# Patient Record
Sex: FEMALE | URBAN - METROPOLITAN AREA
[De-identification: names, ages, dates, MRNs, and addresses within clinical notes are randomized per-mention and may not be internally consistent; named-entity substitution may affect disease eponyms.]

---

## 2020-07-01 ENCOUNTER — INPATIENT (INPATIENT)
Facility: HOSPITAL | Age: 55
LOS: 0 days | Discharge: HOME | End: 2020-07-02
Attending: HOSPITALIST | Admitting: HOSPITALIST
Payer: COMMERCIAL

## 2020-07-01 VITALS
DIASTOLIC BLOOD PRESSURE: 71 MMHG | TEMPERATURE: 98 F | HEIGHT: 62 IN | HEART RATE: 77 BPM | OXYGEN SATURATION: 100 % | WEIGHT: 111.99 LBS | SYSTOLIC BLOOD PRESSURE: 106 MMHG | RESPIRATION RATE: 16 BRPM

## 2020-07-01 DIAGNOSIS — I63.9 CEREBRAL INFARCTION, UNSPECIFIED: ICD-10-CM

## 2020-07-01 LAB
ALBUMIN SERPL ELPH-MCNC: 3.9 G/DL — SIGNIFICANT CHANGE UP (ref 3.3–5)
ALP SERPL-CCNC: 58 U/L — SIGNIFICANT CHANGE UP (ref 40–120)
ALT FLD-CCNC: 17 U/L — SIGNIFICANT CHANGE UP (ref 12–78)
ANION GAP SERPL CALC-SCNC: 6 MMOL/L — SIGNIFICANT CHANGE UP (ref 5–17)
APPEARANCE UR: CLEAR — SIGNIFICANT CHANGE UP
AST SERPL-CCNC: 25 U/L — SIGNIFICANT CHANGE UP (ref 15–37)
BILIRUB SERPL-MCNC: 0.5 MG/DL — SIGNIFICANT CHANGE UP (ref 0.2–1.2)
BILIRUB UR-MCNC: NEGATIVE — SIGNIFICANT CHANGE UP
BUN SERPL-MCNC: 13 MG/DL — SIGNIFICANT CHANGE UP (ref 7–23)
CALCIUM SERPL-MCNC: 9.2 MG/DL — SIGNIFICANT CHANGE UP (ref 8.5–10.1)
CHLORIDE SERPL-SCNC: 105 MMOL/L — SIGNIFICANT CHANGE UP (ref 96–108)
CO2 SERPL-SCNC: 27 MMOL/L — SIGNIFICANT CHANGE UP (ref 22–31)
COLOR SPEC: YELLOW — SIGNIFICANT CHANGE UP
CREAT SERPL-MCNC: 0.88 MG/DL — SIGNIFICANT CHANGE UP (ref 0.5–1.3)
DIFF PNL FLD: NEGATIVE — SIGNIFICANT CHANGE UP
GLUCOSE SERPL-MCNC: 101 MG/DL — HIGH (ref 70–99)
GLUCOSE UR QL: NEGATIVE MG/DL — SIGNIFICANT CHANGE UP
HCT VFR BLD CALC: 41.2 % — SIGNIFICANT CHANGE UP (ref 34.5–45)
HGB BLD-MCNC: 13.8 G/DL — SIGNIFICANT CHANGE UP (ref 11.5–15.5)
KETONES UR-MCNC: ABNORMAL
LEUKOCYTE ESTERASE UR-ACNC: NEGATIVE — SIGNIFICANT CHANGE UP
MCHC RBC-ENTMCNC: 31.3 PG — SIGNIFICANT CHANGE UP (ref 27–34)
MCHC RBC-ENTMCNC: 33.5 GM/DL — SIGNIFICANT CHANGE UP (ref 32–36)
MCV RBC AUTO: 93.4 FL — SIGNIFICANT CHANGE UP (ref 80–100)
NITRITE UR-MCNC: NEGATIVE — SIGNIFICANT CHANGE UP
NRBC # BLD: 0 /100 WBCS — SIGNIFICANT CHANGE UP (ref 0–0)
NT-PROBNP SERPL-SCNC: 20 PG/ML — SIGNIFICANT CHANGE UP (ref 0–125)
PH UR: 5 — SIGNIFICANT CHANGE UP (ref 5–8)
PLATELET # BLD AUTO: 230 K/UL — SIGNIFICANT CHANGE UP (ref 150–400)
POTASSIUM SERPL-MCNC: 4.7 MMOL/L — SIGNIFICANT CHANGE UP (ref 3.5–5.3)
POTASSIUM SERPL-SCNC: 4.7 MMOL/L — SIGNIFICANT CHANGE UP (ref 3.5–5.3)
PROT SERPL-MCNC: 8.5 GM/DL — HIGH (ref 6–8.3)
PROT UR-MCNC: NEGATIVE MG/DL — SIGNIFICANT CHANGE UP
RBC # BLD: 4.41 M/UL — SIGNIFICANT CHANGE UP (ref 3.8–5.2)
RBC # FLD: 12.6 % — SIGNIFICANT CHANGE UP (ref 10.3–14.5)
SARS-COV-2 RNA SPEC QL NAA+PROBE: SIGNIFICANT CHANGE UP
SODIUM SERPL-SCNC: 138 MMOL/L — SIGNIFICANT CHANGE UP (ref 135–145)
SP GR SPEC: 1.01 — SIGNIFICANT CHANGE UP (ref 1.01–1.02)
TROPONIN I SERPL-MCNC: <.015 NG/ML — SIGNIFICANT CHANGE UP (ref 0.01–0.04)
UROBILINOGEN FLD QL: NEGATIVE MG/DL — SIGNIFICANT CHANGE UP
WBC # BLD: 5.63 K/UL — SIGNIFICANT CHANGE UP (ref 3.8–10.5)
WBC # FLD AUTO: 5.63 K/UL — SIGNIFICANT CHANGE UP (ref 3.8–10.5)

## 2020-07-01 PROCEDURE — 71045 X-RAY EXAM CHEST 1 VIEW: CPT | Mod: 26

## 2020-07-01 PROCEDURE — 93010 ELECTROCARDIOGRAM REPORT: CPT

## 2020-07-01 PROCEDURE — 99285 EMERGENCY DEPT VISIT HI MDM: CPT

## 2020-07-01 PROCEDURE — 99223 1ST HOSP IP/OBS HIGH 75: CPT

## 2020-07-01 PROCEDURE — 70450 CT HEAD/BRAIN W/O DYE: CPT | Mod: 26

## 2020-07-01 RX ORDER — ASPIRIN/CALCIUM CARB/MAGNESIUM 324 MG
325 TABLET ORAL ONCE
Refills: 0 | Status: COMPLETED | OUTPATIENT
Start: 2020-07-01 | End: 2020-07-01

## 2020-07-01 RX ORDER — ATORVASTATIN CALCIUM 80 MG/1
40 TABLET, FILM COATED ORAL AT BEDTIME
Refills: 0 | Status: DISCONTINUED | OUTPATIENT
Start: 2020-07-01 | End: 2020-07-02

## 2020-07-01 RX ORDER — ASPIRIN/CALCIUM CARB/MAGNESIUM 324 MG
81 TABLET ORAL DAILY
Refills: 0 | Status: DISCONTINUED | OUTPATIENT
Start: 2020-07-02 | End: 2020-07-02

## 2020-07-01 RX ORDER — ENOXAPARIN SODIUM 100 MG/ML
40 INJECTION SUBCUTANEOUS DAILY
Refills: 0 | Status: DISCONTINUED | OUTPATIENT
Start: 2020-07-01 | End: 2020-07-02

## 2020-07-01 RX ADMIN — Medication 325 MILLIGRAM(S): at 21:23

## 2020-07-01 RX ADMIN — Medication 1 TABLET(S): at 22:53

## 2020-07-01 RX ADMIN — ATORVASTATIN CALCIUM 40 MILLIGRAM(S): 80 TABLET, FILM COATED ORAL at 22:54

## 2020-07-01 RX ADMIN — ENOXAPARIN SODIUM 40 MILLIGRAM(S): 100 INJECTION SUBCUTANEOUS at 22:54

## 2020-07-01 NOTE — ED ADULT TRIAGE NOTE - CHIEF COMPLAINT QUOTE
frontal headaches with pressure behind left eye onset 1130 this am Pt went to urgent care and was sent to ER no weakness, but tingling left side pt denies CP or SOB Pt denies Covid or known covid exposure

## 2020-07-01 NOTE — H&P ADULT - NSHPREVIEWOFSYSTEMS_GEN_ALL_CORE
REVIEW OF SYSTEMS:  CONSTITUTIONAL: No fever, weight loss, or fatigue  EYES: No eye pain, visual disturbances, or discharge  ENMT:  No difficulty hearing, tinnitus, vertigo; No sinus or throat pain  NECK: No pain or stiffness  BREASTS: No pain, masses, or nipple discharge  RESPIRATORY: No cough, wheezing, chills or hemoptysis; No shortness of breath  CARDIOVASCULAR: No chest pain, palpitations, dizziness, or leg swelling  GASTROINTESTINAL: No abdominal or epigastric pain. + nausea, no vomiting, or hematemesis; No diarrhea or constipation. No melena or hematochezia.  GENITOURINARY: No dysuria, frequency, hematuria, or incontinence  NEUROLOGICAL: +headache,  loss of strength, numbness, no tremors  SKIN: No itching, burning, rashes, or lesions   LYMPH NODES: No enlarged glands  ENDOCRINE: No heat or cold intolerance; No hair loss  MUSCULOSKELETAL: No joint pain or swelling; No muscle, back, or extremity pain  PSYCHIATRIC: No depression, anxiety, mood swings, or difficulty sleeping  HEME/LYMPH: No easy bruising, or bleeding gums  ALLERGY AND IMMUNOLOGIC: No hives or eczema

## 2020-07-01 NOTE — CONSULT NOTE ADULT - ASSESSMENT
Pt with acute onset this morning of L side weakness and sensory disturbance.  On examintion now, some seven and a half hours later, has a mild L hemiparesis (UE and LE) with no evident sensory disturbance.  Likely ischemic stroke; suspect R internal capsule.  Head CT has been ordered.    RECOMMENDATIONS:    If no hemorrhage on CT: [id number] mg STAT, then 81  mg daily.  Statin  Clopidogrel 75mg daily x 3 wks  Expect little or no significant findings on head CT given relatively mild deficits and short time since onset.  MR brain (stroke protocol), MRA neck and brain  Work up for causes of possible vasculopathy:    ESR, CRP, Hgb A1c, homocysteine, SPEP, SHARRON, antiphospholipid syndrome panel, D-dimer, ferritin, serum immunofixation,  RF, syphilis serology, complement C3 and C4, methylmalonic acid, protein C. protein S, factor V Leiden.     Cardiac monitoring; TTE. Pt with acute onset this morning of L side weakness and sensory disturbance.  On examintion now, some seven and a half hours later, has a mild L hemiparesis (UE and LE) with no evident sensory disturbance.  Likely ischemic stroke; suspect R internal capsule.  Head CT has been ordered.    RECOMMENDATIONS:    Admit for acute stroke.  If no hemorrhage on CT: [id number] mg STAT, then 81  mg daily; clopidogrel 75mg daily x 3 wks.  Statin  Expect little or no significant findings on head CT given relatively mild deficits and short time since onset.  MR brain (stroke protocol), MRA neck and brain  Work up for causes of possible vasculopathy:    ESR, CRP, Hgb A1c, homocysteine, SPEP, SHARRON, antiphospholipid syndrome panel, D-dimer, ferritin, serum immunofixation,  RF, syphilis serology, complement C3 and C4, methylmalonic acid, protein C. protein S, factor V Leiden.     Cardiac monitoring; TTE.

## 2020-07-01 NOTE — ED ADULT NURSE NOTE - OBJECTIVE STATEMENT
Pt c/o intermittent frontal pressure headache with pressure behind left eye, denies blurry vision. Pt denies dizziness, c/o nausea denies vomiting. Pt c/o tingling and numbness to left side of body, denies chest pain. pt states symptoms started about 11 A.M.

## 2020-07-01 NOTE — H&P ADULT - HISTORY OF PRESENT ILLNESS
53 y/o F with no pmhx comes in since she felt left sided weakness since 11:30 this AM. She states she had bit of pressure below her left eye but had no changes in vision. Pt did have weakness numbness tingling on left arm and hand. Pt had no slurred speech, no facial droop. She saw PMD and was convinced to come to ED for concern of stroke. Pt has no stroke hx in past. Pt has no CP or palpitations, no abd pain, some nausea, no vomiting, no cough no fever, sick contacts. Has history migraine headache, symptoms were different, headache has now resolved. She has no history HTN, HL, DM, cardiac problems or previous neurological disease.	    HIV:    HIV Status:  · Offered: Declined	    PAST MEDICAL/SURGICAL/FAMILY/SOCIAL HISTORY:    Past Medical History:  No pertinent past medical history.     Tobacco Usage:  · Tobacco Usage	Never smoker	    ALLERGIES AND HOME MEDICATIONS:   Allergies:        Allergies:  	No Known Allergies:     Home Medications:   * Incomplete Medication History as of 01-Jul-2020 17:19 documented in Structured Notes  · 	Calcium 600+D oral tablet: Last Dose Taken:  , 500 milligram(s) orally 3 times a day    REVIEW OF SYSTEMS:    Review of Systems:  · ROS STATEMENT: all other ROS negative except as per HPI	    PHYSICAL EXAM:   · CONSTITUTIONAL: Well appearing, awake, alert, oriented to person, place, time/situation and in no apparent distress.	  · ENMT: Airway patent, Nasal mucosa clear. Mouth with normal mucosa. Throat has no vesicles, no oropharyngeal exudates and uvula is midline.	  · EYES: Clear bilaterally, pupils equal, round and reactive to light.	  · CARDIAC: Normal rate, regular rhythm.  Heart sounds S1, S2.  No murmurs, rubs or gallops.	  · RESPIRATORY: Breath sounds clear and equal bilaterally.	  · GASTROINTESTINAL: Abdomen soft, non-tender, no guarding.	  · MUSCULOSKELETAL: Spine appears normal, range of motion is not limited, no muscle or joint tenderness	  · NEUROLOGICAL: - - -	  · NEURO LOC: alert  follows commands	  · NEURO NERVE: cranial nerves 2-12 intact, corneal reflex intact, central and peripheral vision intact, extra-ocular movements intact, tongue is midline	  · NEURO NECK: normal	  · NEURO SPEECH: clear	  · NEURO WEIGHT: + strength is 4/5 in left arm, 4/5 strength in left leg. + slower toe tapping left foot	  · NEURO DTR: normal	  · NEURO SENSATION: present and normal in 4 extremities	  · NEURO COORDINATION: normal	  · NEURO PRONATOR: none	  · NEURO LEG NORM: normal bilaterally	  · NEURO MOTOR: normal	  · NEURO POSTURING: normal	  · SKIN: Skin normal color for race, warm, dry and intact. No evidence of rash.

## 2020-07-01 NOTE — H&P ADULT - NSHPLABSRESULTS_GEN_ALL_CORE
LABS:                        13.8   5.63  )-----------( 230      ( 2020 18:41 )             41.2     07-    138  |  105  |  13  ----------------------------<  101<H>  4.7   |  27  |  0.88    Ca    9.2      2020 18:41    TPro  8.5<H>  /  Alb  3.9  /  TBili  0.5  /  DBili  x   /  AST  25  /  ALT  17  /  AlkPhos  58  07      Urinalysis Basic - ( 2020 18:41 )    Color: Yellow / Appearance: Clear / S.010 / pH: x  Gluc: x / Ketone: Moderate  / Bili: Negative / Urobili: Negative mg/dL   Blood: x / Protein: Negative mg/dL / Nitrite: Negative   Leuk Esterase: Negative / RBC: x / WBC x   Sq Epi: x / Non Sq Epi: x / Bacteria: x      CAPILLARY BLOOD GLUCOSE  COVID-19 PCR: NotDetec: This test has been validated by [employer] to be accurate;  though it has not been FDA cleared/approved by the usual pathway  As with all laboratory test, results should be correlated with clinical  findings.  https://www.fda.gov/media/433902/download  https://www.fda.gov/media/669714/download (20 @ 18:41)  Troponin I, Serum: <.015: ng/mL (20 @ 18:41)  Serum Pro-Brain Natriuretic Peptide: 20 pg/mL (20 @ 18:41)                RADIOLOGY & ADDITIONAL TESTS:  < from: CT Head No Cont (20 @ 19:05) >    IMPRESSION:    1)  unremarkable CT study of the brain  2)  clear sinuses and mastoids..    < end of copied text >    < from: Xray Chest 1 View- PORTABLE-Urgent (20 @ 20:09) >    IMPRESSION: Negative chest.    < end of copied text >      Imaging Personally Reviewed:  [x ] YES  [ ] NO  EKG: sinus@ 79 no acute ST changes

## 2020-07-01 NOTE — H&P ADULT - NSHPPHYSICALEXAM_GEN_ALL_CORE
T(C): 36.6 (01 Jul 2020 16:25), Max: 36.6 (01 Jul 2020 16:25)  T(F): 97.9 (01 Jul 2020 16:25), Max: 97.9 (01 Jul 2020 16:25)  HR: 76 (01 Jul 2020 19:59) (76 - 77)  BP: 102/57 (01 Jul 2020 19:59) (102/57 - 106/71)  BP(mean): --  RR: 15 (01 Jul 2020 19:59) (15 - 16)  SpO2: 100% (01 Jul 2020 19:59) (100% - 100%)    PHYSICAL EXAM:  GENERAL: NAD, well-groomed, well-developed  HEAD:  Atraumatic, Normocephalic  EYES: EOMI, PERRLA, conjunctiva and sclera clear  ENMT: No tonsillar erythema, exudates, or enlargement; Moist mucous membranes, Good dentition, No lesions  NECK: Supple, No JVD, Normal thyroid  NERVOUS SYSTEM:  Alert & Oriented X3, CN 2-12 intact, no focal deficits (NIHSS=0)  CHEST/LUNG: Clear to percussion bilaterally; No rales, rhonchi, wheezing, or rubs  HEART: Regular rate and rhythm; No murmurs, rubs, or gallops  ABDOMEN: Soft, Nontender, Nondistended; Bowel sounds present  EXTREMITIES:  2+ Peripheral Pulses, No clubbing, cyanosis, or edema  LYMPH: No lymphadenopathy noted  SKIN: No rashes or lesions

## 2020-07-01 NOTE — H&P ADULT - PROBLEM SELECTOR PLAN 1
monitor on telemetry, ASA, anticoagulation, statin, MR brain, MRA brain and neck, lipid profile, HgA1c, neurology consult

## 2020-07-01 NOTE — ED PROVIDER NOTE - CRANIAL NERVE AND PUPILLARY EXAM
cranial nerves 2-12 intact/central and peripheral vision intact/tongue is midline/corneal reflex intact/extra-ocular movements intact

## 2020-07-01 NOTE — ED PROVIDER NOTE - CLINICAL SUMMARY MEDICAL DECISION MAKING FREE TEXT BOX
DDX: CVA, NIH stroke scale 2, outside of TPA window. r/o ICH   PLAN: CT head. CBC, CMP, troponin, chest x-ray, EKG, neuro consult likely admit.

## 2020-07-01 NOTE — CONSULT NOTE ADULT - SUBJECTIVE AND OBJECTIVE BOX
Pt interviewed and examined in ED with Dr. Easley, ED attending who had called for neurological consultation.      Per ED provider Note:    <Start of quote from ED Provider Note>  "· Chief Complaint: The patient is a 54y Female complaining of headache.	  · HPI Objective Statement: 55 y/o F with no pmhx comes in since she felt left sided weakness since 11:30 this AM. She states she had bit of pressure below her left eye but had no changes in vision. Pt did have weakness numbness tingling on left arm and hand. Pt had no slurred speech, no facial droop. She saw PMD and was convinced to come to ED for concern of stroke. Pt has no stroke hx in past. Pt has no CP no abd pain, no cough no fever.	    HIV:    HIV Status:  · Offered: Declined	    PAST MEDICAL/SURGICAL/FAMILY/SOCIAL HISTORY:    Past Medical History:  No pertinent past medical history.     Tobacco Usage:  · Tobacco Usage	Never smoker	    ALLERGIES AND HOME MEDICATIONS:   Allergies:        Allergies:  	No Known Allergies:     Home Medications:   * Incomplete Medication History as of 2020 17:19 documented in Structured Notes  · 	Calcium 600+D oral tablet: Last Dose Taken:  , 500 milligram(s) orally 3 times a day    REVIEW OF SYSTEMS:    Review of Systems:  · ROS STATEMENT: all other ROS negative except as per HPI"	  <End of quote from ED Provider Note>    Above Hx was reconfirmed on my interviewing Pt. Additional Hx: Fx of L wrist some years ago.  She denies having had any previous episodes of neurologic deficits.  Has no pain at this time.  Father  of cancer.  No relevant family history (stroke, autoimmune disease, vascular diseas in first degree relatives).      EXAMINATION     Awake, alert, fully aware of environment and situation.  Medium thin to thin build.  Normal comprhension, expression, prosody, speech articulation.  Normal facila/lingual movements.  PERRL; EOMI with smooth pursuit.  Confrontation visual fields grossly intact.      No UE drift.  Mildly clumsy Quinton of L fingers, L eft toe tapping, and (less so) left heel stomping.  On confrontation testing of limb motor power there is clearly demonstrable weakness of LUE and LLE.  For examples: thumb/forfinger pinch, and even more so thumb apposition, are weak compared with the R side.  The L triceps can be "broken" with only modest effort (with elbow locked in extension) while the R triceps cannot be "broken" even with substantial force.  L biceps <50% compared with R.  L quadriceps can be "broken" when she locks the knee in extension (this should not be possible in someone with normal strength).  L hamstrings <= 50% of R.      No subjective appreciation of difference in P and LT sensation  L vs. R side (face, neck, UEs, LEs).  No extinction of LT on DSS.      Reflex                           Right    Left   Comment    Scapulohumeral               0        0  Biceps                             1+     1+  Triceps                            tr       tr  Patellar                            2       2+  Gastroc                            0       0  Plantar                           mute   mute

## 2020-07-01 NOTE — ED PROVIDER NOTE - OBJECTIVE STATEMENT
55 y/o F with no pmhx comes in since she felt left sided weakness since 11:30 this AM. She states she had bit of pressure below her left eye but had no changes in vision. Pt did have weakness numbness tingling on left arm and hand. Pt had no slurred speech, no facial droop. She saw PMD and was convinced to come to ED for concern of stroke. Pt has no stroke hx in past. Pt has no CP no abd pain, no cough no fever.

## 2020-07-01 NOTE — H&P ADULT - ASSESSMENT
53 y/o F with no pmhx comes in since she felt left sided weakness since 11:30 this AM. She states she had bit of pressure below her left eye but had no changes in vision. Pt did have weakness numbness tingling on left arm and hand. Pt had no slurred speech, no facial droop. She saw PMD and was convinced to come to ED for concern of stroke. Pt has no stroke hx in past. Pt has no CP or palpitations, no abd pain, some nausea, no vomiting, no cough no fever, sick contacts. Has history migraine headache, symptoms were different, headache has now resolved. She has no history HTN, HL, DM, cardiac problems or previous neurological disease.        IMPROVE VTE Individual Risk Assessment          RISK                                                          Points    [  ] Previous VTE                                                3    [  ] Thrombophilia                                             2    [  ] Lower limb paralysis                                    2        (unable to hold up >15 seconds)      [  ] Current Cancer                                             2         (within 6 months)    [  ] Immobilization > 24 hrs                              1    [  ] ICU/CCU stay > 24 hours                            1    [  ] Age > 60                                                    1    IMPROVE VTE Score __0_______ 55 y/o F with no pmhx comes in since she felt left sided weakness since 11:30 this AM. She states she had bit of pressure below her left eye but had no changes in vision. Pt did have weakness numbness tingling on left arm and hand. Pt had no slurred speech, no facial droop. She saw PMD and was convinced to come to ED for concern of stroke. Pt has no stroke hx in past. Pt has no CP or palpitations, no abd pain, some nausea, no vomiting, no cough no fever, sick contacts. Has history migraine headache, symptoms were different, headache has now resolved. She has no history HTN, HL, DM, cardiac problems or previous neurological disease. Pt seen by Dr Nino neurologist in ED, pt had L sided  hemiparesis at that time, not felt to be TPA candidate. Pt passed dysphagia screen.        IMPROVE VTE Individual Risk Assessment          RISK                                                          Points    [  ] Previous VTE                                                3    [  ] Thrombophilia                                             2    [  ] Lower limb paralysis                                    2        (unable to hold up >15 seconds)      [  ] Current Cancer                                             2         (within 6 months)    [  ] Immobilization > 24 hrs                              1    [  ] ICU/CCU stay > 24 hours                            1    [  ] Age > 60                                                    1    IMPROVE VTE Score __0_______

## 2020-07-02 ENCOUNTER — TRANSCRIPTION ENCOUNTER (OUTPATIENT)
Age: 55
End: 2020-07-02

## 2020-07-02 VITALS
RESPIRATION RATE: 18 BRPM | HEART RATE: 89 BPM | OXYGEN SATURATION: 98 % | DIASTOLIC BLOOD PRESSURE: 69 MMHG | SYSTOLIC BLOOD PRESSURE: 94 MMHG

## 2020-07-02 LAB
A1C WITH ESTIMATED AVERAGE GLUCOSE RESULT: 5.3 % — SIGNIFICANT CHANGE UP (ref 4–5.6)
ANA TITR SER: NEGATIVE — SIGNIFICANT CHANGE UP
ANION GAP SERPL CALC-SCNC: 6 MMOL/L — SIGNIFICANT CHANGE UP (ref 5–17)
BASOPHILS # BLD AUTO: 0.03 K/UL — SIGNIFICANT CHANGE UP (ref 0–0.2)
BASOPHILS NFR BLD AUTO: 0.7 % — SIGNIFICANT CHANGE UP (ref 0–2)
BUN SERPL-MCNC: 8 MG/DL — SIGNIFICANT CHANGE UP (ref 7–23)
CALCIUM SERPL-MCNC: 9.4 MG/DL — SIGNIFICANT CHANGE UP (ref 8.5–10.1)
CHLORIDE SERPL-SCNC: 106 MMOL/L — SIGNIFICANT CHANGE UP (ref 96–108)
CHOLEST SERPL-MCNC: 163 MG/DL — SIGNIFICANT CHANGE UP (ref 10–199)
CO2 SERPL-SCNC: 29 MMOL/L — SIGNIFICANT CHANGE UP (ref 22–31)
CREAT SERPL-MCNC: 0.78 MG/DL — SIGNIFICANT CHANGE UP (ref 0.5–1.3)
EOSINOPHIL # BLD AUTO: 0.08 K/UL — SIGNIFICANT CHANGE UP (ref 0–0.5)
EOSINOPHIL NFR BLD AUTO: 2 % — SIGNIFICANT CHANGE UP (ref 0–6)
ESTIMATED AVERAGE GLUCOSE: 105 MG/DL — SIGNIFICANT CHANGE UP (ref 68–114)
GLUCOSE SERPL-MCNC: 76 MG/DL — SIGNIFICANT CHANGE UP (ref 70–99)
HCT VFR BLD CALC: 40.4 % — SIGNIFICANT CHANGE UP (ref 34.5–45)
HCV AB S/CO SERPL IA: 0.12 S/CO — SIGNIFICANT CHANGE UP (ref 0–0.99)
HCV AB SERPL-IMP: SIGNIFICANT CHANGE UP
HDLC SERPL-MCNC: 65 MG/DL — SIGNIFICANT CHANGE UP
HGB BLD-MCNC: 13.1 G/DL — SIGNIFICANT CHANGE UP (ref 11.5–15.5)
IMM GRANULOCYTES NFR BLD AUTO: 0 % — SIGNIFICANT CHANGE UP (ref 0–1.5)
LIPID PNL WITH DIRECT LDL SERPL: 85 MG/DL — SIGNIFICANT CHANGE UP
LYMPHOCYTES # BLD AUTO: 1.19 K/UL — SIGNIFICANT CHANGE UP (ref 1–3.3)
LYMPHOCYTES # BLD AUTO: 29.4 % — SIGNIFICANT CHANGE UP (ref 13–44)
MCHC RBC-ENTMCNC: 31.3 PG — SIGNIFICANT CHANGE UP (ref 27–34)
MCHC RBC-ENTMCNC: 32.4 GM/DL — SIGNIFICANT CHANGE UP (ref 32–36)
MCV RBC AUTO: 96.4 FL — SIGNIFICANT CHANGE UP (ref 80–100)
MONOCYTES # BLD AUTO: 0.46 K/UL — SIGNIFICANT CHANGE UP (ref 0–0.9)
MONOCYTES NFR BLD AUTO: 11.4 % — SIGNIFICANT CHANGE UP (ref 2–14)
NEUTROPHILS # BLD AUTO: 2.29 K/UL — SIGNIFICANT CHANGE UP (ref 1.8–7.4)
NEUTROPHILS NFR BLD AUTO: 56.5 % — SIGNIFICANT CHANGE UP (ref 43–77)
NRBC # BLD: 0 /100 WBCS — SIGNIFICANT CHANGE UP (ref 0–0)
PLATELET # BLD AUTO: 215 K/UL — SIGNIFICANT CHANGE UP (ref 150–400)
POTASSIUM SERPL-MCNC: 4.2 MMOL/L — SIGNIFICANT CHANGE UP (ref 3.5–5.3)
POTASSIUM SERPL-SCNC: 4.2 MMOL/L — SIGNIFICANT CHANGE UP (ref 3.5–5.3)
RBC # BLD: 4.19 M/UL — SIGNIFICANT CHANGE UP (ref 3.8–5.2)
RBC # FLD: 12.8 % — SIGNIFICANT CHANGE UP (ref 10.3–14.5)
SARS-COV-2 IGG SERPL QL IA: NEGATIVE — SIGNIFICANT CHANGE UP
SARS-COV-2 IGM SERPL IA-ACNC: 0.01 INDEX — SIGNIFICANT CHANGE UP
SODIUM SERPL-SCNC: 141 MMOL/L — SIGNIFICANT CHANGE UP (ref 135–145)
TOTAL CHOLESTEROL/HDL RATIO MEASUREMENT: 2.5 RATIO — LOW (ref 3.3–7.1)
TRIGL SERPL-MCNC: 63 MG/DL — SIGNIFICANT CHANGE UP (ref 10–149)
WBC # BLD: 4.05 K/UL — SIGNIFICANT CHANGE UP (ref 3.8–10.5)
WBC # FLD AUTO: 4.05 K/UL — SIGNIFICANT CHANGE UP (ref 3.8–10.5)

## 2020-07-02 PROCEDURE — 70547 MR ANGIOGRAPHY NECK W/O DYE: CPT | Mod: 26

## 2020-07-02 PROCEDURE — 99239 HOSP IP/OBS DSCHRG MGMT >30: CPT

## 2020-07-02 PROCEDURE — 99233 SBSQ HOSP IP/OBS HIGH 50: CPT

## 2020-07-02 PROCEDURE — 70551 MRI BRAIN STEM W/O DYE: CPT | Mod: 26

## 2020-07-02 PROCEDURE — 70544 MR ANGIOGRAPHY HEAD W/O DYE: CPT | Mod: 26,59

## 2020-07-02 PROCEDURE — 93306 TTE W/DOPPLER COMPLETE: CPT | Mod: 26

## 2020-07-02 RX ORDER — CLOPIDOGREL BISULFATE 75 MG/1
1 TABLET, FILM COATED ORAL
Qty: 20 | Refills: 0
Start: 2020-07-02 | End: 2020-07-21

## 2020-07-02 RX ORDER — ASPIRIN/CALCIUM CARB/MAGNESIUM 324 MG
1 TABLET ORAL
Qty: 0 | Refills: 0 | DISCHARGE
Start: 2020-07-02

## 2020-07-02 RX ORDER — CLOPIDOGREL BISULFATE 75 MG/1
1 TABLET, FILM COATED ORAL
Qty: 0 | Refills: 0 | DISCHARGE
Start: 2020-07-02

## 2020-07-02 RX ORDER — CLOPIDOGREL BISULFATE 75 MG/1
75 TABLET, FILM COATED ORAL DAILY
Refills: 0 | Status: DISCONTINUED | OUTPATIENT
Start: 2020-07-02 | End: 2020-07-02

## 2020-07-02 RX ORDER — ASPIRIN/CALCIUM CARB/MAGNESIUM 324 MG
1 TABLET ORAL
Qty: 30 | Refills: 0
Start: 2020-07-02 | End: 2020-07-31

## 2020-07-02 RX ORDER — ATORVASTATIN CALCIUM 80 MG/1
1 TABLET, FILM COATED ORAL
Qty: 0 | Refills: 0 | DISCHARGE
Start: 2020-07-02

## 2020-07-02 RX ORDER — ATORVASTATIN CALCIUM 80 MG/1
1 TABLET, FILM COATED ORAL
Qty: 30 | Refills: 0
Start: 2020-07-02 | End: 2020-07-31

## 2020-07-02 RX ADMIN — Medication 1 TABLET(S): at 05:20

## 2020-07-02 RX ADMIN — Medication 1 TABLET(S): at 13:34

## 2020-07-02 RX ADMIN — ENOXAPARIN SODIUM 40 MILLIGRAM(S): 100 INJECTION SUBCUTANEOUS at 11:34

## 2020-07-02 RX ADMIN — CLOPIDOGREL BISULFATE 75 MILLIGRAM(S): 75 TABLET, FILM COATED ORAL at 11:35

## 2020-07-02 RX ADMIN — Medication 81 MILLIGRAM(S): at 11:35

## 2020-07-02 NOTE — PHYSICAL THERAPY INITIAL EVALUATION ADULT - PERTINENT HX OF CURRENT PROBLEM, REHAB EVAL
Pt is a 55 y/o female who presented to ER due to acute onset of neurological deficits. Symptoms have since resolved . Pt is diagnosed with CVA. MRI on 7/2/2020 results confirm No acute infarct.

## 2020-07-02 NOTE — OCCUPATIONAL THERAPY INITIAL EVALUATION ADULT - ADDITIONAL COMMENTS
Prior to admission, pt was functioning in her roles, self sufficient, driving  & ambulating independently without any assistive devices. Pt does not shown any significant changes when compared to pre admission status. Pt moves all extremities without difficulties and integrates both sides of her body. Pt has good balance & is able to reach out of base of support in sitting and standing without loss of balance. Pt has good muscle strength, good coordination and normal muscle tone. Pt ambulated at brenda on unit without any adaptive device. Pt does not display any deficits that warrant OT intervention at this time. Pt is discharges from OT service, however, pt will be re-evaluated if she demonstrates functional decline. Pt is right hand dominant and wears glasses for reading. Prior to admission, pt was functioning in her roles, self sufficient, driving  & ambulating independently without any assistive devices. Pt does not shown any significant changes when compared to pre admission status. Pt moves all extremities without difficulties and integrates both sides of her body. Pt has good balance & is able to reach out of base of support in sitting and standing without loss of balance. Pt has good muscle strength, good coordination and normal muscle tone. Pt ambulated at brenda on unit without any adaptive device. Pt does not display any deficits that warrant OT intervention at this time. Pt is discharges from OT service, however, pt will be re-evaluated if pt demonstrates functional decline. Pt is right hand dominant and wears glasses for reading.

## 2020-07-02 NOTE — OCCUPATIONAL THERAPY INITIAL EVALUATION ADULT - GENERAL OBSERVATIONS, REHAB EVAL
Pt was seen for initial OT consult, encountered on cardiac monitoring and in NAD. IV heplock is in place. Pt was AA&Ox4, cooperative & followed commands.  Pt denied pain, numbness or tingling, Pt's speech is clear and she make needs & wants known. Pt moves all extremities without difficulties and integrates both sides of her body. Pt was seen for initial OT consult, encountered on cardiac monitoring and in NAD. IV heplock is in place. Pt was AA&Ox4, cooperative & followed commands.  Pt denied pain, numbness or tingling, Pt's speech is clear; she makes needs & wants known. Pt moves all extremities without difficulties and integrates both sides of her body.

## 2020-07-02 NOTE — PROGRESS NOTE ADULT - SUBJECTIVE AND OBJECTIVE BOX
Pt reports no longer aware of any motor dysfunction.  On detailed examination of motor function:    intact symmetric normal (rapid) Quinton of fingers, forearms, LEs  (toe tapping and heel stomping).  Strong symmetric thumb/forefinger pinch, thumb apposition, elbow flex/ext, hip flex, knee flex/ext.  Walks fully up symmetrically on toes of both feet; walks well and symmetrically on heels; hops symmtrically and well on toes of each foot.      Brain MRI; neck and brain MRAs, all normal.      I note Hgb 1c 5.3%; SHARRON - specimen sent to lab; result pending.
Patient is a 54y old  Female who presents with a chief complaint of Headache and left sided weakness and numbness. (2020 21:44)      INTERVAL HPI/OVERNIGHT EVENTS:    MEDICATIONS  (STANDING):  aspirin enteric coated 81 milliGRAM(s) Oral daily  atorvastatin 40 milliGRAM(s) Oral at bedtime  calcium carbonate 1250 mG  + Vitamin D (OsCal 500 + D) 1 Tablet(s) Oral three times a day  clopidogrel Tablet 75 milliGRAM(s) Oral daily  enoxaparin Injectable 40 milliGRAM(s) SubCutaneous daily    MEDICATIONS  (PRN):      Allergies    No Known Allergies    Intolerances        REVIEW OF SYSTEMS:  CONSTITUTIONAL: No fever, weight loss, or fatigue  EYES: No eye pain, visual disturbances, or discharge  ENMT:  No difficulty hearing, tinnitus, vertigo; No sinus or throat pain  NECK: No pain or stiffness  BREASTS: No pain, masses, or nipple discharge  RESPIRATORY: No cough, wheezing, chills or hemoptysis; No shortness of breath  CARDIOVASCULAR: No chest pain, palpitations, dizziness, or leg swelling  GASTROINTESTINAL: No abdominal or epigastric pain. No nausea, vomiting, or hematemesis; No diarrhea or constipation. No melena or hematochezia.  GENITOURINARY: No dysuria, frequency, hematuria, or incontinence  NEUROLOGICAL: No headaches, memory loss, loss of strength, numbness, or tremors  SKIN: No itching, burning, rashes, or lesions   LYMPH NODES: No enlarged glands  ENDOCRINE: No heat or cold intolerance; No hair loss  MUSCULOSKELETAL: No joint pain or swelling; No muscle, back, or extremity pain  PSYCHIATRIC: No depression, anxiety, mood swings, or difficulty sleeping  HEME/LYMPH: No easy bruising, or bleeding gums  ALLERGY AND IMMUNOLOGIC: No hives or eczema    Vital Signs Last 24 Hrs  T(C): 36.7 (2020 11:34), Max: 36.9 (2020 00:08)  T(F): 98 (2020 11:34), Max: 98.4 (2020 00:08)  HR: 65 (2020 11:34) (65 - 78)  BP: 91/54 (2020 11:34) (91/54 - 106/71)  BP(mean): --  RR: 18 (2020 11:34) (15 - 18)  SpO2: 98% (2020 11:34) (98% - 100%)    PHYSICAL EXAM:  GENERAL: NAD, well-groomed, well-developed  HEAD:  Atraumatic, Normocephalic  EYES: EOMI, PERRLA, conjunctiva and sclera clear  ENMT: No tonsillar erythema, exudates, or enlargement; Moist mucous membranes, Good dentition, No lesions  NECK: Supple, No JVD, Normal thyroid  NERVOUS SYSTEM:  Alert & Oriented X3, Good concentration; Motor Strength 5/5 B/L upper and lower extremities; DTRs 2+ intact and symmetric  CHEST/LUNG: Clear to percussion bilaterally; No rales, rhonchi, wheezing, or rubs  HEART: Regular rate and rhythm; No murmurs, rubs, or gallops  ABDOMEN: Soft, Nontender, Nondistended; Bowel sounds present  EXTREMITIES:  2+ Peripheral Pulses, No clubbing, cyanosis, or edema  LYMPH: No lymphadenopathy noted  SKIN: No rashes or lesions    LABS:                        13.1   4.05  )-----------( 215      ( 2020 07:51 )             40.4     07-02    141  |  106  |  8   ----------------------------<  76  4.2   |  29  |  0.78    Ca    9.4      2020 07:51    TPro  8.5<H>  /  Alb  3.9  /  TBili  0.5  /  DBili  x   /  AST  25  /  ALT  17  /  AlkPhos  58  07-01      Urinalysis Basic - ( 2020 18:41 )    Color: Yellow / Appearance: Clear / S.010 / pH: x  Gluc: x / Ketone: Moderate  / Bili: Negative / Urobili: Negative mg/dL   Blood: x / Protein: Negative mg/dL / Nitrite: Negative   Leuk Esterase: Negative / RBC: x / WBC x   Sq Epi: x / Non Sq Epi: x / Bacteria: x      CAPILLARY BLOOD GLUCOSE          RADIOLOGY & ADDITIONAL TESTS:    Imaging Personally Reviewed:  [ ] YES  [ ] NO    Consultant(s) Notes Reviewed:  [ ] YES  [ ] NO    Care Discussed with Consultants/Other Providers [ ] YES  [ ] NO

## 2020-07-02 NOTE — OCCUPATIONAL THERAPY INITIAL EVALUATION ADULT - LIVES WITH, PROFILE
her 86 y/o mother, who is independent in a private house with 8 steps equipped with hand rail All living amenities are located on one level. The bathroom has a tub/shower combination, fixed shower head and standard toilet. ,

## 2020-07-02 NOTE — DISCHARGE NOTE PROVIDER - CARE PROVIDER_API CALL
Rosalind Santos  NEUROLOGY  [address] [place] [address]  Phone: [phone]  Fax: [phone]  Follow Up Time:     PMD,   Please follow up with your primary doctor in 1-2 weeks.  Phone: (   )    -  Fax: (   )    -  Follow Up Time: PMD,   Please follow up with your primary doctor in 1-2 weeks.  Phone: (   )    -  Fax: (   )    -  Follow Up Time:     [facility],   [address], [place]  Phone: [phone]  Fax: (   )    -  Follow Up Time:

## 2020-07-02 NOTE — DISCHARGE NOTE PROVIDER - HOSPITAL COURSE
55 yo F with no PMH presented to the ED on 7/1/20 with acute Lt hemiparesis x 7 hours, without sensory disturbance. Initial CT head negative, pt was admitted for acute CVA. MR brain and MRA head & neck unremarkable. Lipid profile and HbA1C within normal limits. Pt now with full resolution of symptoms, cleared by neurology. Started on ASA, plavix and statin. Currently stable for discharge. 55 yo F with no PMH presented to the ED on 7/1/20 with acute Lt hemiparesis x 7 hours, without sensory disturbance. Initial CT head negative, pt was admitted for acute CVA. MR brain and MRA head & neck unremarkable. Lipid profile and HbA1C within normal limits. Pt now with full resolution of symptoms; cleared by neurology, PT and OT. Started on ASA, plavix and statin. Currently stable for discharge.

## 2020-07-02 NOTE — DISCHARGE NOTE PROVIDER - PROVIDER TOKENS
PROVIDER:[TOKEN:[[id number]]],FREE:[LAST:[PMD],PHONE:[(   )    -],FAX:[(   )    -],ADDRESS:[Please follow up with your primary doctor in 1-2 weeks.]] FREE:[LAST:[PMD],PHONE:[(   )    -],FAX:[(   )    -],ADDRESS:[Please follow up with your primary doctor in 1-2 weeks.]],FREE:[LAST:[[facility]],PHONE:[phone]],FAX:[(   )    -],ADDRESS:[[address] [place] [address]]]

## 2020-07-02 NOTE — OCCUPATIONAL THERAPY INITIAL EVALUATION ADULT - SOCIAL CONCERNS
Pt voiced concerns about current DX of CVA and hope it does not recur./Complex psychosocial needs/coping issues

## 2020-07-02 NOTE — DISCHARGE NOTE PROVIDER - NSDCCPCAREPLAN_GEN_ALL_CORE_FT
PRINCIPAL DISCHARGE DIAGNOSIS  Diagnosis: Cerebrovascular accident (CVA), unspecified mechanism  Assessment and Plan of Treatment: Please follow up with a neurologist within 1-2 weeks. Continue to take aspirin, plavix and atorvastatin daily.

## 2020-07-02 NOTE — PHYSICAL THERAPY INITIAL EVALUATION ADULT - GENERAL OBSERVATIONS, REHAB EVAL
Pt was seen for initial PT consult, encountered standing on her room's door,  cardiac monitoring in place, NAD. AxOx4, did not complaint of pain, cooperative

## 2020-07-02 NOTE — PHYSICAL THERAPY INITIAL EVALUATION ADULT - ADDITIONAL COMMENTS
as per patient she lives in a  with her 88 y/o mother, she has 6 stairs to get inside the house with R HR, inside the house she states In the first floor where she can maneuver independently with no AD. she still drives and does work as a .

## 2020-07-02 NOTE — OCCUPATIONAL THERAPY INITIAL EVALUATION ADULT - PERTINENT HX OF CURRENT PROBLEM, REHAB EVAL
Pt is a 55 y/o female who presented to ER due to acute onset of neurological deficit Symptoms has since resolved . Pt is diagnosed with CVA. MRI on 7/2/2020 results confirm No acute infarct. Pt is a 53 y/o female who presented to ER due to acute onset of neurological deficits. Symptoms have since resolved . Pt is diagnosed with CVA. MRI on 7/2/2020 results confirm No acute infarct.

## 2020-07-02 NOTE — PROGRESS NOTE ADULT - PROBLEM SELECTOR PLAN 1
no events on telemetry  c/w , ASA, anticoagulation, statin for now    MR brain, MRA brain and neck, lipid profile, HgA1c, all wnl   f.u echo results and pt but possible dc today

## 2020-07-02 NOTE — PHYSICAL THERAPY INITIAL EVALUATION ADULT - LIVES WITH, PROFILE
her 88 y/o mother, who is independent in a private house with 8 steps equipped with hand rail All living amenities are located on one level. The bathroom has a tub/shower combination, fixed shower head and standard toilet. ,

## 2020-07-02 NOTE — DISCHARGE NOTE NURSING/CASE MANAGEMENT/SOCIAL WORK - PATIENT PORTAL LINK FT
You can access the FollowMyHealth Patient Portal offered by [facility] by registering at the following website: [email or web address]. By joining [facility]’s FollowMyHealth portal, you will also be able to view your health information using other applications (apps) compatible with our system.

## 2020-07-02 NOTE — PROGRESS NOTE ADULT - ASSESSMENT
Minor stroke with rapid clinical recovery.      MR/DWI negative ischemic stroke.      ADDITIONAL Hx:    Never used tobacco products; used OC for 3 years ~20 years ago.      RECOMMENDATIONS    Unchanged: ASA 81mg daily; statin; clopidogrel 75mg daily for a total of 21 days.      Reminder: please have samples drawn for additional lab tests to work up for unusual stroke etiology:          ESR, CRP, homocysteine, SPEP, antiphospholipid syndrome panel, D-dimer, ferritin, serum immunofixation,  RF, syphilis serology, complement C3 and C4, methylmalonic acid, protein C. protein S, factor V Leiden.     Follow-up out-pt with stroke neurology  [facility]  [address]    
53 y/o F with no pmhx comes in since she felt left sided weakness since 11:30 this AM. She states she had bit of pressure below her left eye but had no changes in vision. Pt did have weakness numbness tingling on left arm and hand. Pt had no slurred speech, no facial droop. She saw PMD and was convinced to come to ED for concern of stroke. Pt has no stroke hx in past. Pt has no CP or palpitations, no abd pain, some nausea, no vomiting, no cough no fever, sick contacts. Has history migraine headache, symptoms were different, headache has now resolved. She has no history HTN, HL, DM, cardiac problems or previous neurological disease. Pt seen by Dr Nino neurologist in ED, pt had mild L sided  hemiparesis at that time, not felt to be TPA candidate. Pt passed dysphagia screen.      Sxs now currently resolved and is asymptomatic. MRI neg for cva or vascular stenosis. Will discuss with neurology. echo results pending. pt apparently has questionable left sided weakness yesterday and pt reports that has been on and off for some time.

## 2020-07-02 NOTE — DISCHARGE NOTE PROVIDER - NSDCMRMEDTOKEN_GEN_ALL_CORE_FT
aspirin 81 mg oral delayed release tablet: 1 tab(s) orally once a day  aspirin 81 mg oral delayed release tablet: 1 tab(s) orally once a day  atorvastatin 40 mg oral tablet: 1 tab(s) orally once a day (at bedtime)  Calcium 600+D oral tablet: 500 milligram(s) orally 3 times a day  clopidogrel 75 mg oral tablet: 1 tab(s) orally once a day  clopidogrel 75 mg oral tablet: 1 tab(s) orally once a day  Lipitor 40 mg oral tablet: 1 tab(s) orally once a day (at bedtime)

## 2020-07-04 ENCOUNTER — TRANSCRIPTION ENCOUNTER (OUTPATIENT)
Age: 55
End: 2020-07-04

## 2020-07-06 PROBLEM — Z78.9 OTHER SPECIFIED HEALTH STATUS: Chronic | Status: ACTIVE | Noted: 2020-07-01

## 2020-07-07 DIAGNOSIS — I63.9 CEREBRAL INFARCTION, UNSPECIFIED: ICD-10-CM

## 2020-07-08 ENCOUNTER — APPOINTMENT (OUTPATIENT)
Dept: NEUROLOGY | Facility: CLINIC | Age: 55
End: 2020-07-08
Payer: COMMERCIAL

## 2020-07-08 DIAGNOSIS — I63.9 CEREBRAL INFARCTION, UNSPECIFIED: ICD-10-CM

## 2020-07-08 PROCEDURE — 99204 OFFICE O/P NEW MOD 45 MIN: CPT | Mod: 95

## 2020-07-08 RX ORDER — ASPIRIN 81 MG/1
81 TABLET, COATED ORAL
Refills: 0 | Status: ACTIVE | COMMUNITY

## 2020-10-02 ENCOUNTER — APPOINTMENT (OUTPATIENT)
Dept: NEUROLOGY | Facility: CLINIC | Age: 55
End: 2020-10-02
Payer: COMMERCIAL

## 2020-10-02 VITALS
HEART RATE: 89 BPM | DIASTOLIC BLOOD PRESSURE: 71 MMHG | HEIGHT: 62 IN | SYSTOLIC BLOOD PRESSURE: 126 MMHG | WEIGHT: 105 LBS | BODY MASS INDEX: 19.32 KG/M2

## 2020-10-02 VITALS — TEMPERATURE: 97.6 F

## 2020-10-02 DIAGNOSIS — R20.0 ANESTHESIA OF SKIN: ICD-10-CM

## 2020-10-02 PROCEDURE — 99215 OFFICE O/P EST HI 40 MIN: CPT

## 2020-10-02 NOTE — DISCUSSION/SUMMARY
[FreeTextEntry1] : Impression: left sided weakness, transient, less likely ischemic given lack of vascular risk factors and negative imaging. DDx includes possible migraine as reported by patient, however patient's previous clinical history and timeline doesn't necessarily support this differential. \par \par Plan:\par -no further stroke workup at this time\par -c/w ASA qd\par -c/w secondary stroke prevention w/ risk factor control \par -follow up as needed. \par \par \par  PCP: Dr. Justin Pan (P) [phone]  (F) [phone] \par \par  [Antithrombotic therapy with ___] : antithrombotic therapy with  [unfilled] [Patient encouraged to discuss with Primary MD] : I encouraged the patient to discuss these important issues with ~his/her~ primary care doctor [Goals and Counseling] : I have reviewed the goals of stroke risk factor modification. I counseled the patient on measures to reduce stroke risk, including the importance of medication compliance, risk factor control, exercise, healthy diet and avoidance of smoking. I reviewed stroke warning signs and symptoms and appropriate actions to take if such occur.

## 2020-10-02 NOTE — HISTORY OF PRESENT ILLNESS
[FreeTextEntry1] : F/u 10/2/20: 54 yo woman presents to clinic for follow up regarding previous transient left sided weakness. Previously diagnosed with MR negative stroke while at [place]. Since then, workup including hypercoagulable workup has been unremarkable. No new symptoms since. Patient has done well with controlling any vascular risk factors. \par __________\par Ms. Rojas is a 54 year old female with no signifcant PMHx who presents today for post hospitalization follow up (telemedicine) after a stroke on 7/1/2020\par \par HPI (from admission) \par 55 y/o F with no pmhx comes in since she felt left sided weakness since 11:30 this AM. She states she had bit of pressure below her left eye but had no changes in vision. Pt did have weakness numbness tingling on left arm and hand for about 7 hours. Pt had no slurred speech, no facial droop. She saw PMD and was convinced to come to ED for concern of stroke. Pt has no stroke hx in past. Pt has no CP no abd pain, no cough no fever. CT and MRI/MRA head and neck were negative for acute stroke. SHARRON titer negative \par \par \par Today patient reports feeling back to baseline. Patient has no history of migraines (except for one 5 years ago that she was told was an ocular migraine), she has left shoulder issues at baseline and has been doing acupuncture to help with it. On the day before her admission she had acupuncture and herbal stomach burning. On the day of admission her left arm and foot were involved as well as a headache. She has no history of clots and had 2 miscarriages (early on). She states she is only taking ASA at this time. She did not want to take the Plavix or Lipitor because she states no one explained it to her. TTE negative in the hospital. She reports that she has intermittent left calf pain with no swelling or temperature changes \par \par

## 2020-10-02 NOTE — PHYSICAL EXAM
[General Appearance - Alert] : alert [General Appearance - In No Acute Distress] : in no acute distress [Oriented To Time, Place, And Person] : oriented to person, place, and time [Person] : oriented to person [Place] : oriented to place [Time] : oriented to time [Concentration Intact] : normal concentrating ability [Visual Intact] : visual attention was ~T not ~L decreased [Naming Objects] : no difficulty naming common objects [Repeating Phrases] : no difficulty repeating a phrase [Writing A Sentence] : no difficulty writing a sentence [Fluency] : fluency intact [Comprehension] : comprehension intact [Reading] : reading intact [Past History] : adequate knowledge of personal past history [Cranial Nerves Facial (VII)] : face symmetrical [Cranial Nerves Hypoglossal (XII)] : there was no tongue deviation with protrusion [No Muscle Atrophy] : normal bulk in all four extremities [Sensation Tactile Decrease] : light touch was intact [Abnormal Walk] : normal gait [Past-pointing] : there was no past-pointing [Tremor] : no tremor present [Plantar Reflex Right Only] : normal on the right [Plantar Reflex Left Only] : normal on the left [Skin Color & Pigmentation] : normal skin color and pigmentation

## 2021-09-30 ENCOUNTER — OUTPATIENT (OUTPATIENT)
Dept: OUTPATIENT SERVICES | Facility: HOSPITAL | Age: 56
LOS: 1 days | Discharge: HOME | End: 2021-09-30

## 2021-10-01 DIAGNOSIS — F43.20 ADJUSTMENT DISORDER, UNSPECIFIED: ICD-10-CM

## 2022-06-10 NOTE — PHYSICAL THERAPY INITIAL EVALUATION ADULT - DIAGNOSIS, PT EVAL
Spoke to son, Imelda Méndez, about patient's hospital course  Updated Joni on patient's condition on admission and today and the current treatment plan  Answered all questions and addressed all concerns  weakness

## 2023-01-27 ENCOUNTER — APPOINTMENT (OUTPATIENT)
Dept: ORTHOPEDIC SURGERY | Facility: CLINIC | Age: 58
End: 2023-01-27
Payer: COMMERCIAL

## 2023-01-27 DIAGNOSIS — Z00.00 ENCOUNTER FOR GENERAL ADULT MEDICAL EXAMINATION W/OUT ABNORMAL FINDINGS: ICD-10-CM

## 2023-01-27 DIAGNOSIS — M72.2 PLANTAR FASCIAL FIBROMATOSIS: ICD-10-CM

## 2023-01-27 PROCEDURE — 99204 OFFICE O/P NEW MOD 45 MIN: CPT

## 2023-01-27 PROCEDURE — 73630 X-RAY EXAM OF FOOT: CPT | Mod: LT

## 2023-01-27 RX ORDER — MELOXICAM 15 MG/1
15 TABLET ORAL DAILY
Qty: 30 | Refills: 3 | Status: COMPLETED | COMMUNITY
Start: 2023-01-27 | End: 2023-05-27

## 2023-01-27 NOTE — HISTORY OF PRESENT ILLNESS
[6] : 6 [2] : 2 [Burning] : burning [Localized] : localized [Sharp] : sharp [Intermittent] : intermittent [Household chores] : household chores [Leisure] : leisure [Work] : work [Social interactions] : social interactions [Rest] : rest [Sitting] : sitting [Physical therapy] : physical therapy [Standing] : standing [Walking] : walking [Exercising] : exercising [Stairs] : stairs [de-identified] : Pt is a 57 year old F who presents today for evaluation of lef left foot. Pt states that 6/2022 she was walking in shoes and felt as though there was something on the bottom of her foot. Went to podiatry who dx'd her with insertional tendinopathy. Did not take XR but offered CSI which she declined. Started to develop pain localized along the plantar heel. Reports startup pain. Denies trauma/previous injury. No numbness/tingling. Epsom salt bath to affected area. No other formal treatment to date. WB in sneakers. She has been doing home stretching with some improvement. [] : Post Surgical Visit: no [FreeTextEntry1] : L foot [FreeTextEntry9] : heat

## 2023-01-27 NOTE — PHYSICAL EXAM
[NL (40)] : plantar flexion 40 degrees [NL 30)] : inversion 30 degrees [NL (20)] : eversion 20 degrees [5___] : [facility] 5[unfilled]/5 [2+] : posterior tibialis pulse: 2+ [Normal] : saphenous nerve sensation normal [] : patient ambulates without assistive device [Left] : left foot [There are no fractures, subluxations or dislocations. No significant abnormalities are seen] : There are no fractures, subluxations or dislocations. No significant abnormalities are seen

## 2023-01-27 NOTE — ASSESSMENT
[FreeTextEntry1] : Patient likely has a mild plantar fascitis.\par Lenard recommended\par PT referral given.\par Mobic prescribed.\par \par The patient was explained the options as well as benefits of over the counter verses prescription strength nonsteroidal anti-inflammatory medication. The patient opts for a prescription strength medication.\par

## 2023-02-24 ENCOUNTER — APPOINTMENT (OUTPATIENT)
Dept: ORTHOPEDIC SURGERY | Facility: CLINIC | Age: 58
End: 2023-02-24

## 2025-05-19 NOTE — ED ADULT NURSE NOTE - RESPIRATION RHYTHM, QM
Diagnosis:   1. Iron deficiency anemia secondary to inadequate dietary iron intake        Patient arrived for infusion today.     Dr. Eason is the ordering clinician, therapy plan orders reviewed and signed by clinician.     Vital Signs:  ONC OP Encounter Vitals  BP: 124/81  Heart Rate: 79  Resp: 14  Temp: 98 °F (36.7 °C)  Temp src: Temporal      Allergies:  ALLERGIES:  No Known Allergies      Labs reviewed with the patient: Yes    Nursing Summary:  Patient received 200 mg venofer via a peripheral iv and had a CBC drawn.    Patient condition stable during treatment? Yes  Questions were answered and understanding was verbalized. Yes   Education provided Yes    Patient advised on follow up, any and all questions answered.  Patient Discharged to home Ambulatory with self.   
regular